# Patient Record
Sex: FEMALE | Race: BLACK OR AFRICAN AMERICAN | NOT HISPANIC OR LATINO | URBAN - METROPOLITAN AREA
[De-identification: names, ages, dates, MRNs, and addresses within clinical notes are randomized per-mention and may not be internally consistent; named-entity substitution may affect disease eponyms.]

---

## 2021-01-05 ENCOUNTER — APPOINTMENT (EMERGENCY)
Dept: RADIOLOGY | Facility: HOSPITAL | Age: 36
End: 2021-01-05

## 2021-01-05 ENCOUNTER — HOSPITAL ENCOUNTER (EMERGENCY)
Facility: HOSPITAL | Age: 36
Discharge: HOME | End: 2021-01-05
Attending: EMERGENCY MEDICINE

## 2021-01-05 VITALS
OXYGEN SATURATION: 95 % | RESPIRATION RATE: 18 BRPM | SYSTOLIC BLOOD PRESSURE: 131 MMHG | TEMPERATURE: 98.4 F | DIASTOLIC BLOOD PRESSURE: 82 MMHG | HEART RATE: 82 BPM

## 2021-01-05 DIAGNOSIS — R05.9 COUGH: ICD-10-CM

## 2021-01-05 DIAGNOSIS — J40 BRONCHITIS: Primary | ICD-10-CM

## 2021-01-05 PROCEDURE — 71045 X-RAY EXAM CHEST 1 VIEW: CPT

## 2021-01-05 PROCEDURE — 99284 EMERGENCY DEPT VISIT MOD MDM: CPT | Performed by: EMERGENCY MEDICINE

## 2021-01-05 PROCEDURE — U0003 INFECTIOUS AGENT DETECTION BY NUCLEIC ACID (DNA OR RNA); SEVERE ACUTE RESPIRATORY SYNDROME CORONAVIRUS 2 (SARS-COV-2) (CORONAVIRUS DISEASE [COVID-19]), AMPLIFIED PROBE TECHNIQUE, MAKING USE OF HIGH THROUGHPUT TECHNOLOGIES AS DESCRIBED BY CMS-2020-01-R: HCPCS | Performed by: EMERGENCY MEDICINE

## 2021-01-05 PROCEDURE — 99283 EMERGENCY DEPT VISIT LOW MDM: CPT

## 2021-01-05 RX ORDER — AZITHROMYCIN 250 MG/1
TABLET, FILM COATED ORAL
Qty: 10 TABLET | Refills: 0 | Status: SHIPPED | OUTPATIENT
Start: 2021-01-05 | End: 2021-01-09

## 2021-01-05 RX ORDER — IBUPROFEN 600 MG/1
600 TABLET ORAL ONCE
Status: COMPLETED | OUTPATIENT
Start: 2021-01-05 | End: 2021-01-05

## 2021-01-05 RX ORDER — KETOROLAC TROMETHAMINE 30 MG/ML
30 INJECTION, SOLUTION INTRAMUSCULAR; INTRAVENOUS ONCE
Status: DISCONTINUED | OUTPATIENT
Start: 2021-01-05 | End: 2021-01-05

## 2021-01-05 RX ORDER — PREDNISONE 20 MG/1
20 TABLET ORAL DAILY
Qty: 5 TABLET | Refills: 0 | Status: SHIPPED | OUTPATIENT
Start: 2021-01-05 | End: 2021-01-10

## 2021-01-05 RX ORDER — ACETAMINOPHEN 325 MG/1
975 TABLET ORAL ONCE
Status: COMPLETED | OUTPATIENT
Start: 2021-01-05 | End: 2021-01-05

## 2021-01-05 RX ORDER — ALBUTEROL SULFATE 90 UG/1
2 AEROSOL, METERED RESPIRATORY (INHALATION) ONCE
Status: COMPLETED | OUTPATIENT
Start: 2021-01-05 | End: 2021-01-05

## 2021-01-05 RX ORDER — AZITHROMYCIN 250 MG/1
TABLET, FILM COATED ORAL
Qty: 10 TABLET | Refills: 0 | Status: SHIPPED | OUTPATIENT
Start: 2021-01-05 | End: 2021-01-05 | Stop reason: CLARIF

## 2021-01-05 RX ORDER — PREDNISONE 20 MG/1
40 TABLET ORAL ONCE
Status: COMPLETED | OUTPATIENT
Start: 2021-01-05 | End: 2021-01-05

## 2021-01-05 RX ORDER — ONDANSETRON 4 MG/1
4 TABLET, ORALLY DISINTEGRATING ORAL ONCE
Status: COMPLETED | OUTPATIENT
Start: 2021-01-05 | End: 2021-01-05

## 2021-01-05 RX ORDER — AZITHROMYCIN 500 MG/1
500 TABLET, FILM COATED ORAL ONCE
Status: COMPLETED | OUTPATIENT
Start: 2021-01-05 | End: 2021-01-05

## 2021-01-05 RX ADMIN — ACETAMINOPHEN 975 MG: 325 TABLET, FILM COATED ORAL at 06:51

## 2021-01-05 RX ADMIN — ALBUTEROL SULFATE 2 PUFF: 90 AEROSOL, METERED RESPIRATORY (INHALATION) at 05:27

## 2021-01-05 RX ADMIN — ONDANSETRON 4 MG: 4 TABLET, ORALLY DISINTEGRATING ORAL at 06:51

## 2021-01-05 RX ADMIN — PREDNISONE 40 MG: 20 TABLET ORAL at 04:59

## 2021-01-05 RX ADMIN — AZITHROMYCIN 500 MG: 500 TABLET, FILM COATED ORAL at 04:59

## 2021-01-05 RX ADMIN — IBUPROFEN 600 MG: 600 TABLET, FILM COATED ORAL at 06:51

## 2021-01-05 NOTE — DISCHARGE INSTRUCTIONS
You were seen in the emergency room today for shortness of breath  Your symptoms may be due to bronchitis or undertreated pneumonia  I have given you a prescription for steroids and azithromycin, which is an antibiotic  Please follow-up with your primary care doctor  If you have any new or worsening symptoms, please return to your nearest emergency department

## 2021-01-05 NOTE — ED ATTENDING ATTESTATION
1/5/2021  INnamdi MD, saw and evaluated the patient  I have discussed the patient with the resident/non-physician practitioner and agree with the resident's/non-physician practitioner's findings, Plan of Care, and MDM as documented in the resident's/non-physician practitioner's note, except where noted  All available labs and Radiology studies were reviewed  I was present for key portions of any procedure(s) performed by the resident/non-physician practitioner and I was immediately available to provide assistance  At this point I agree with the current assessment done in the Emergency Department  I have conducted an independent evaluation of this patient a history and physical is as follows:    ED Course     Emergency Department Note- Abhijit Joseph 38 y o  female MRN: [id number]    Unit/Bed#: ED 02 Encounter: [id number]    Abhijit Joseph is a 38 y o  female who presents with   Chief Complaint   Patient presents with    Cold Like Symptoms     x 1 month  antibiotics not helping         History of Present Illness   HPI:  Abhijit Joseph is a 38 y o  female who presents for evaluation of:  Cough and congestion over the last month  Patient was treated within the last month with antibiotics without resolutions  Patient has been sick with cough and congestion over the last 6 weeks  Patient denies anosmia and ageusia  Patient denies sick contracts  Patient continues to smoke cigarettes daily; smoking cessation encouraged  Review of Systems   Constitutional: Positive for chills, fatigue and fever  HENT: Positive for congestion and rhinorrhea  Respiratory: Positive for wheezing  All other systems reviewed and are negative  Historical Information   No past medical history on file  No past surgical history on file    Social History   Social History     Substance and Sexual Activity   Alcohol Use Not on file     Social History     Substance and Sexual Activity   Drug Use Not on file     Social History     Tobacco Use   Smoking Status Not on file     Family History: non-contributory    Meds/Allergies   all medications and allergies reviewed  No Known Allergies    Objective   First Vitals:   Blood Pressure: 131/82 (21)  Pulse: 82 (21)  Temperature: 98 4 °F (36 9 °C) (21)  Temp Source: Oral (21)  Respirations: 18 (21)  SpO2: 95 % (21)    Current Vitals:   Blood Pressure: 131/82 (21)  Pulse: 82 (21)  Temperature: 98 4 °F (36 9 °C) (21)  Temp Source: Oral (21)  Respirations: 18 (21)  SpO2: 95 % (21)    No intake or output data in the 24 hours ending 21 0446    Invasive Devices     None                 Physical Exam  Vitals signs and nursing note reviewed  Constitutional:       Appearance: Normal appearance  HENT:      Head: Normocephalic and atraumatic  Mouth/Throat:      Mouth: Mucous membranes are moist       Pharynx: Oropharynx is clear  Cardiovascular:      Rate and Rhythm: Normal rate and regular rhythm  Pulmonary:      Effort: Pulmonary effort is normal  No respiratory distress  Breath sounds: Normal breath sounds  Abdominal:      General: Bowel sounds are normal       Palpations: Abdomen is soft  Musculoskeletal: Normal range of motion  General: No tenderness  Skin:     General: Skin is warm and dry  Capillary Refill: Capillary refill takes less than 2 seconds  Neurological:      General: No focal deficit present  Mental Status: She is alert and oriented to person, place, and time  Medical Decision Makin  Cough congestion wheezing    No results found for this or any previous visit (from the past 36 hour(s))  No orders to display         Portions of the record may have been created with voice recognition software   Occasional wrong word or "sound a like" substitutions may have occurred due to the inherent limitations of voice recognition software  Read the chart carefully and recognize, using context, where substitutions have occurred          Critical Care Time  Procedures

## 2021-01-05 NOTE — ED PROVIDER NOTES
History  Chief Complaint   Patient presents with    Cold Like Symptoms     x 1 month  antibiotics not helping     Calderon Almeida is a 37 yo woman with medical history significant for asthma presenting with a productive cough and shortness of breath over the last 6 weeks  She initially saw a doctor around November 30th and was prescribed a course of amoxicillin  Her cough and shortness of breath did not improve  Her cough is productive  She has no fevers, chills, nausea, vomiting, chest pain  She has had this happen before and states that typically group resolves after she was prescribed antibiotics and steroids  She has no known sick contacts, has had no recent COVID exposures  Has no loss since taste or smell  None       No past medical history on file  No past surgical history on file  No family history on file  I have reviewed and agree with the history as documented  No existing history information found  No existing history information found  Social History     Tobacco Use    Smoking status: Not on file   Substance Use Topics    Alcohol use: Not on file    Drug use: Not on file        Review of Systems   Constitutional: Negative for chills and fever  HENT: Negative for congestion and rhinorrhea  Respiratory: Positive for cough, shortness of breath and wheezing  Gastrointestinal: Negative for diarrhea, nausea and vomiting  All other systems reviewed and are negative  Physical Exam  ED Triage Vitals [01/05/21 0357]   Temperature Pulse Respirations Blood Pressure SpO2   98 4 °F (36 9 °C) 82 18 131/82 95 %      Temp Source Heart Rate Source Patient Position - Orthostatic VS BP Location FiO2 (%)   Oral Monitor Sitting Right arm --      Pain Score       --             Orthostatic Vital Signs  Vitals:    01/05/21 0357   BP: 131/82   Pulse: 82   Patient Position - Orthostatic VS: Sitting       Physical Exam  Vitals signs reviewed     Constitutional:       Appearance: She is not toxic-appearing  Comments: Patient coughing room  HENT:      Head: Normocephalic and atraumatic  Eyes:      Conjunctiva/sclera: Conjunctivae normal    Cardiovascular:      Rate and Rhythm: Normal rate and regular rhythm  Pulses: Normal pulses  Pulmonary:      Effort: Pulmonary effort is normal  No tachypnea, accessory muscle usage or respiratory distress  Breath sounds: No stridor  Examination of the right-upper field reveals wheezing  Examination of the left-upper field reveals wheezing  Examination of the right-middle field reveals wheezing  Examination of the left-middle field reveals wheezing  Examination of the right-lower field reveals wheezing  Examination of the left-lower field reveals wheezing  Wheezing present  Abdominal:      General: There is no distension  Musculoskeletal:         General: No deformity  Lymphadenopathy:      Cervical: No cervical adenopathy  Skin:     General: Skin is warm and dry  Capillary Refill: Capillary refill takes less than 2 seconds  Neurological:      General: No focal deficit present  Mental Status: She is alert  ED Medications  Medications   albuterol (PROVENTIL HFA,VENTOLIN HFA) inhaler 2 puff (2 puffs Inhalation Given 1/5/21 0527)   predniSONE tablet 40 mg (40 mg Oral Given 1/5/21 0459)   azithromycin (ZITHROMAX) tablet 500 mg (500 mg Oral Given 1/5/21 0459)   ondansetron (ZOFRAN-ODT) dispersible tablet 4 mg (4 mg Oral Given 1/5/21 0651)   acetaminophen (TYLENOL) tablet 975 mg (975 mg Oral Given 1/5/21 0651)   ibuprofen (MOTRIN) tablet 600 mg (600 mg Oral Given 1/5/21 0651)       Diagnostic Studies  Results Reviewed     Procedure Component Value Units Date/Time    Novel Coronavirus (COVID-19), PCR [facility] [[id number]] Collected: 01/05/21 0524    Lab Status:  In process Specimen: Nasopharyngeal Swab Updated: 01/05/21 0529                 XR chest 1 view portable   ED Interpretation by Kalli Dumont MD (01/05 5545) No focal infiltrate      Final Result by Vitaliy Bedolla MD (01/05 1175)      No acute cardiopulmonary disease  Workstation performed: [id number]               Procedures  Procedures      ED Course  ED Course as of Jan 05 0848   Tue Jan 05, 2021   1152 No consolidations concerning for focal pneumonia  SBIRT 22yo+      Most Recent Value   SBIRT (24 yo +)   In order to provide better care to our patients, we are screening all of our patients for alcohol and drug use  Would it be okay to ask you these screening questions? No Filed at: 01/05/2021 0556   Initial Alcohol Screen: US AUDIT-C    1  How often do you have a drink containing alcohol? 2 Filed at: 01/05/2021 0556   2  How many drinks containing alcohol do you have on a typical day you are drinking? 1 Filed at: 01/05/2021 0556   3a  Male UNDER 65: How often do you have five or more drinks on one occasion? 0 Filed at: 01/05/2021 0556   3b  FEMALE Any Age, or MALE 65+: How often do you have 4 or more drinks on one occassion? 0 Filed at: 01/05/2021 0556   Audit-C Score  3 Filed at: 01/05/2021 0556                MDM  Number of Diagnoses or Management Options  Bronchitis:   Cough:   Diagnosis management comments: Differential includes but is not limited to: bronchitis, pneumonia, COVID  Will treat with albuterol inhaler, azithromycin, and prednisone  Will evaluate with CXR  Patient given first dose of all medications in ER  Reassessed and cough has significantly improved, patient is breathing significantly more comfortably  However, she is now complaining of nausea and a headache  Will treat with ibuprofen, tylenol, and Zofran  CXR shows no focal pneumonia, appears to be normal      At time of discharge, patient's headache and nausea had resolved, she reports that her cough is significantly improved  Provided with prescription for azithromycin x10 days and prednisone x5 days, and recommended to follow up with PCP  COVID test sent out, patient will be called with results when they return  Disposition  Final diagnoses:   Bronchitis   Cough     Time reflects when diagnosis was documented in both MDM as applicable and the Disposition within this note     Time User Action Codes Description Comment    1/5/2021  7:24 AM Deitra Rosin Add [J40] Bronchitis     1/5/2021  7:24 AM Deitra Rosin Add [R05] Cough       ED Disposition     ED Disposition Condition Date/Time Comment    Discharge Stable Tue Jan 5, 2021  [address] discharge to home/self care  Follow-up Information     Follow up With Specialties Details Why Contact Info Additional [address] Emergency Department Emergency Medicine  If symptoms worsen [address] [place] [address], [address] [place], [place], [address]   [phone]          Discharge Medication List as of 1/5/2021  7:29 AM      START taking these medications    Details   predniSONE 20 mg tablet Take 1 tablet (20 mg total) by mouth daily for 5 days, Starting Tue 1/5/2021, Until Sun 1/10/2021, Print      azithromycin (Zithromax Z-Guanaco) 250 mg tablet Take 2 tablets today then 1 tablet daily x 4 days, Print           No discharge procedures on file  PDMP Review     None           ED Provider  Attending physically available and evaluated Christina Penaloza I managed the patient along with the ED Attending      Electronically Signed by         Renetta Willis MD  01/05/21 0962

## 2021-01-06 LAB — SARS-COV-2 RNA SPEC QL NAA+PROBE: NOT DETECTED

## 2021-06-03 ENCOUNTER — HOSPITAL ENCOUNTER (EMERGENCY)
Facility: HOSPITAL | Age: 36
Discharge: HOME | End: 2021-06-03
Attending: EMERGENCY MEDICINE
Payer: COMMERCIAL

## 2021-06-03 VITALS
HEIGHT: 64 IN | SYSTOLIC BLOOD PRESSURE: 124 MMHG | RESPIRATION RATE: 18 BRPM | DIASTOLIC BLOOD PRESSURE: 81 MMHG | TEMPERATURE: 98 F | WEIGHT: 232.81 LBS | OXYGEN SATURATION: 98 % | BODY MASS INDEX: 39.75 KG/M2 | HEART RATE: 78 BPM

## 2021-06-03 DIAGNOSIS — T78.3XXA ANGIOEDEMA, INITIAL ENCOUNTER: ICD-10-CM

## 2021-06-03 DIAGNOSIS — T78.40XA ALLERGIC REACTION, INITIAL ENCOUNTER: Primary | ICD-10-CM

## 2021-06-03 PROCEDURE — 99283 EMERGENCY DEPT VISIT LOW MDM: CPT

## 2021-06-03 PROCEDURE — 99284 EMERGENCY DEPT VISIT MOD MDM: CPT | Performed by: EMERGENCY MEDICINE

## 2021-06-03 RX ORDER — ONDANSETRON 4 MG/1
4 TABLET, ORALLY DISINTEGRATING ORAL ONCE
Status: COMPLETED | OUTPATIENT
Start: 2021-06-03 | End: 2021-06-03

## 2021-06-03 RX ORDER — DIPHENHYDRAMINE HYDROCHLORIDE 50 MG/ML
25 INJECTION INTRAMUSCULAR; INTRAVENOUS ONCE
Status: DISCONTINUED | OUTPATIENT
Start: 2021-06-03 | End: 2021-06-03

## 2021-06-03 RX ORDER — DIPHENHYDRAMINE HCL 25 MG
25 TABLET ORAL ONCE
Status: COMPLETED | OUTPATIENT
Start: 2021-06-03 | End: 2021-06-03

## 2021-06-03 RX ORDER — DIPHENHYDRAMINE HCL 25 MG
25 TABLET ORAL EVERY 6 HOURS PRN
Qty: 20 TABLET | Refills: 0 | Status: SHIPPED | OUTPATIENT
Start: 2021-06-03

## 2021-06-03 RX ORDER — ONDANSETRON 2 MG/ML
4 INJECTION INTRAMUSCULAR; INTRAVENOUS ONCE
Status: DISCONTINUED | OUTPATIENT
Start: 2021-06-03 | End: 2021-06-03

## 2021-06-03 RX ORDER — FAMOTIDINE 20 MG/1
20 TABLET, FILM COATED ORAL ONCE
Status: COMPLETED | OUTPATIENT
Start: 2021-06-03 | End: 2021-06-03

## 2021-06-03 RX ORDER — FAMOTIDINE 20 MG/1
20 TABLET, FILM COATED ORAL 2 TIMES DAILY
Qty: 14 TABLET | Refills: 0 | Status: SHIPPED | OUTPATIENT
Start: 2021-06-03 | End: 2021-06-10

## 2021-06-03 RX ADMIN — ONDANSETRON 4 MG: 4 TABLET, ORALLY DISINTEGRATING ORAL at 14:23

## 2021-06-03 RX ADMIN — DIPHENHYDRAMINE HCL 25 MG: 25 TABLET ORAL at 14:23

## 2021-06-03 RX ADMIN — FAMOTIDINE 20 MG: 20 TABLET ORAL at 14:23

## 2021-06-03 NOTE — ED PROVIDER NOTES
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 itch  Last night she ate shrimp around 2000 and then around 2100 developed mild swelling and tingling of her lips  This when she woke up in the swelling was worse and has not gotten better  Did not take any medications for this prior to arrival   Has no other known allergies  Denies tongue swelling, throat swelling or itching, shortness of breath, chest tightness, wheezing  Also complains of some mild right upper quadrant discomfort that she attributes to "gas pains" and mild nausea without vomiting for the past few hours  Denies fever, chills, cough, chest pain, headache, any other complaints  Only medication she takes is Ambien and this is not a new medication  None       History reviewed  No pertinent past medical history  History reviewed  No pertinent surgical history  History reviewed  No pertinent family history  I have reviewed and agree with the history as documented  E-Cigarette/Vaping     E-Cigarette/Vaping Substances     Social History     Tobacco Use    Smoking status: Unknown If Ever Smoked   Substance Use Topics    Alcohol Use     Frequency: Never    Drug use: Never        Review of Systems   Constitutional: Negative  Negative for chills and fever  HENT: Positive for facial swelling  Negative for rhinorrhea  Eyes: Negative  Respiratory: Negative  Negative for cough and shortness of breath  Cardiovascular: Negative  Negative for chest pain and leg swelling  Gastrointestinal: Negative    Negative for abdominal pain, diarrhea, nausea and vomiting  Genitourinary: Negative  Negative for dysuria, flank pain and frequency  Musculoskeletal: Negative  Negative for back pain and neck pain  Skin: Negative  Negative for rash  Neurological: Negative  Negative for light-headedness and headaches  All other systems reviewed and are negative  Physical Exam  ED Triage Vitals [06/03/21 1346]   Temperature Pulse Respirations Blood Pressure SpO2   98 °F (36 7 °C) 78 18 124/81 98 %      Temp Source Heart Rate Source Patient Position - Orthostatic VS BP Location FiO2 (%)   Oral -- -- -- --      Pain Score       8             Orthostatic Vital Signs  Vitals:    06/03/21 1346   BP: 124/81   Pulse: 78       Physical Exam  Vitals signs and nursing note reviewed  Constitutional:       General: She is not in acute distress  Appearance: She is well-developed  HENT:      Head: Normocephalic and atraumatic  Mouth/Throat:      Mouth: Mucous membranes are moist  Angioedema present  Comments: No tongue or oropharyngeal swelling  Eyes:      Pupils: Pupils are equal, round, and reactive to light  Neck:      Musculoskeletal: Normal range of motion and neck supple  Cardiovascular:      Rate and Rhythm: Normal rate and regular rhythm  Pulses:           Radial pulses are 2+ on the right side and 2+ on the left side  Heart sounds: Normal heart sounds  No murmur  No friction rub  No gallop  Pulmonary:      Effort: Pulmonary effort is normal  No respiratory distress  Breath sounds: Normal breath sounds  No stridor  No wheezing, rhonchi or rales  Abdominal:      Palpations: Abdomen is soft  Tenderness: There is no abdominal tenderness  There is no guarding or rebound  Musculoskeletal: Normal range of motion  General: No swelling or tenderness  Right lower leg: No edema  Left lower leg: No edema  Skin:     General: Skin is warm and dry  Capillary Refill: Capillary refill takes less than 2 seconds  Neurological:      Mental Status: She is alert and oriented to person, place, and time  Cranial Nerves: No cranial nerve deficit  Comments: Clear fluent speech         ED Medications  Medications   famotidine (PEPCID) tablet 20 mg (20 mg Oral Given 6/3/21 1423)   ondansetron (ZOFRAN-ODT) dispersible tablet 4 mg (4 mg Oral Given 6/3/21 1423)   diphenhydrAMINE (BENADRYL) tablet 25 mg (25 mg Oral Given 6/3/21 1423)       Diagnostic Studies  Results Reviewed     None                 No orders to display         Procedures  Procedures      ED Course                                       MDM  Number of Diagnoses or Management Options  Allergic reaction, initial encounter:   Angioedema, initial encounter:   Diagnosis management comments: 34-year-old female with history of asthma presents emergency department for isolated lip swelling  Airways patent  No respiratory distress  Will give Benadryl, Pepcid, Zofran and reassess  Final assessment:  Patient remained stable after an hour of observation  Sending home with prescriptions for medications given in the emergency department  Provided a referral to Allergy Medicine  Strict ED return precautions provided should symptoms worsen and patient can otherwise follow up outpatient  Patient expresses an understanding and agreement with the plan and remains in good condition for discharge  Disposition  Final diagnoses: Allergic reaction, initial encounter   Angioedema, initial encounter     Time reflects when diagnosis was documented in both MDM as applicable and the Disposition within this note     Time User Action Codes Description Comment    6/3/2021  2:31 PM Yee Foote [T78 40XA] Allergic reaction, initial encounter     6/3/2021  2:31 PM Yee Foote Jamas Coral  3XXA] Angioedema, initial encounter       ED Disposition     ED Disposition Condition Date/Time Comment    Discharge Good Thu Hoang 3, 2021  2:43 PM Ardell Ing discharge to home/self care             Follow-up Information     Follow up With Specialties Details Why [address]  Emergency Department Emergency Medicine Go to  If symptoms worsen [address]  [address] [facility] [address] Emergency Department, [address], [place], [place], [address]  Internal Medicine Call in 1 day  [address] [facility] [address]  [facility] [address], [address] [facility]  [address], [place], [place], [address]   [address] [place] [address] [facility] Allergy Call in 1 day  [address] [facility]  [address] [address]  [address]  [address], [facility]          Patient's Medications   Discharge Prescriptions    DIPHENHYDRAMINE (BENADRYL) 25 MG TABLET    Take 1 tablet (25 mg total) by mouth every 6 (six) hours as needed for itching or allergies       Start Date: 6/3/2021  End Date: --       Order Dose: 25 mg       Quantity: 20 tablet    Refills: 0    FAMOTIDINE (PEPCID) 20 MG TABLET    Take 1 tablet (20 mg total) by mouth 2 (two) times a day for 7 days       Start Date: 6/3/2021  End Date: 6/10/2021       Order Dose: 20 mg       Quantity: 14 tablet    Refills: 0     No discharge procedures on file  PDMP Review     None           ED Provider  Attending physically available and evaluated Cheyanne Daley I managed the patient along with the ED Attending      Electronically Signed by         [place] MD  06/03/21 9164

## 2021-06-03 NOTE — ED ATTENDING ATTESTATION
6/3/2021  IGarrison DO, saw and evaluated the patient  I have discussed the patient with the resident/non-physician practitioner and agree with the resident's/non-physician practitioner's findings, Plan of Care, and MDM as documented in the resident's/non-physician practitioner's note, except where noted  All available labs and Radiology studies were reviewed  I was present for key portions of any procedure(s) performed by the resident/non-physician practitioner and I was immediately available to provide assistance  At this point I agree with the current assessment done in the Emergency Department  I have conducted an independent evaluation of this patient a history and physical is as follows:    Patient is a 50-year-old female history of a documented trimmed allergy per allergy testing about 2 years ago, says that normally she does not eat shrimp since then but has occasionally had tripped without any significant problems  She did have a significant allergic reaction where she had bilateral lip swelling about 3 years ago which occurred prior to her allergy testing  Last evening about 10:00 p m  She ate shrimp, then about 11:00 p m  Noticed some slight swelling and tingling of her lips  Woke this morning knows the lips and was a bit worse  No fever, no chills, no cough, no trouble swallowing, no trouble breathing, no medication taken prior to arrival   She denies taking any other medications with the exception of Ambien for sleep but is not on lisinopril or any other Ace inhibitor  General:  Patient is well-appearing  Head:  Atraumatic  Eyes:  Conjunctiva pink  ENT:  Patient has some mild swelling of her upper and lower lip  There is no swelling the tongue, no swelling the posterior pharynx, no swelling the floor the mouth  There is no stridor  There is no UD deviation  She has no trismus    Neck:  Supple  Cardiac:  S1-S2, without murmurs  Lungs:  Clear to auscultation bilaterally  Abdomen: Soft, nontender, normal bowel sounds, no CVA tenderness, no tympany, no rigidity, no guarding  Extremities:  Normal range of motion  Neurologic:  Awake, fluent speech, normal comprehension  AAOx3  Skin:  Pink warm and dry, no urticaria  Psychiatric:  Alert, pleasant, cooperative            ED Course     Patient has no signs of anaphylaxis, will treat with Pepcid and Benadryl  Patient advised not to eat anymore shrimp the future, she agreed she would not eat shrimp in the future      Critical Care Time  Procedures

## 2021-06-03 NOTE — Clinical Note
Diego Han was seen and treated in our emergency department on 6/3/2021  Diagnosis:     Fay Sanchez    She may return on this date: 06/05/2021         If you have any questions or concerns, please don't hesitate to call        Deondre Trivedi, DO    ______________________________           _______________          _______________  Hospital Representative                              Date                                Time